# Patient Record
Sex: FEMALE | Race: WHITE | NOT HISPANIC OR LATINO | Employment: FULL TIME | URBAN - METROPOLITAN AREA
[De-identification: names, ages, dates, MRNs, and addresses within clinical notes are randomized per-mention and may not be internally consistent; named-entity substitution may affect disease eponyms.]

---

## 2017-03-16 ENCOUNTER — DOCUMENTATION (OUTPATIENT)
Dept: BARIATRICS/WEIGHT MGMT | Facility: CLINIC | Age: 37
End: 2017-03-16

## 2017-03-16 DIAGNOSIS — R53.83 FATIGUE, UNSPECIFIED TYPE: ICD-10-CM

## 2017-03-16 DIAGNOSIS — R06.00 DYSPNEA, UNSPECIFIED TYPE: Primary | ICD-10-CM

## 2017-03-16 DIAGNOSIS — R10.13 DYSPEPSIA: ICD-10-CM

## 2017-03-27 DIAGNOSIS — R53.83 FATIGUE, UNSPECIFIED TYPE: ICD-10-CM

## 2017-03-27 DIAGNOSIS — R10.13 DYSPEPSIA: ICD-10-CM

## 2017-03-27 DIAGNOSIS — R06.00 DYSPNEA, UNSPECIFIED TYPE: ICD-10-CM

## 2017-03-29 ENCOUNTER — DOCUMENTATION (OUTPATIENT)
Dept: BARIATRICS/WEIGHT MGMT | Facility: HOSPITAL | Age: 37
End: 2017-03-29

## 2017-03-29 ENCOUNTER — OFFICE VISIT (OUTPATIENT)
Dept: BARIATRICS/WEIGHT MGMT | Facility: CLINIC | Age: 37
End: 2017-03-29

## 2017-03-29 VITALS
OXYGEN SATURATION: 99 % | RESPIRATION RATE: 18 BRPM | HEIGHT: 69 IN | TEMPERATURE: 98.1 F | HEART RATE: 88 BPM | WEIGHT: 293 LBS | SYSTOLIC BLOOD PRESSURE: 124 MMHG | BODY MASS INDEX: 43.4 KG/M2 | DIASTOLIC BLOOD PRESSURE: 84 MMHG

## 2017-03-29 DIAGNOSIS — R53.83 OTHER FATIGUE: ICD-10-CM

## 2017-03-29 DIAGNOSIS — M54.9 BACK PAIN, UNSPECIFIED BACK LOCATION, UNSPECIFIED BACK PAIN LATERALITY, UNSPECIFIED CHRONICITY: ICD-10-CM

## 2017-03-29 DIAGNOSIS — M17.0 OSTEOARTHRITIS OF BOTH KNEES, UNSPECIFIED OSTEOARTHRITIS TYPE: ICD-10-CM

## 2017-03-29 DIAGNOSIS — G47.33 OBSTRUCTIVE SLEEP APNEA SYNDROME: Primary | ICD-10-CM

## 2017-03-29 PROCEDURE — 99406 BEHAV CHNG SMOKING 3-10 MIN: CPT | Performed by: SURGERY

## 2017-03-29 PROCEDURE — 99205 OFFICE O/P NEW HI 60 MIN: CPT | Performed by: SURGERY

## 2017-03-29 NOTE — PROGRESS NOTES
[facility] GROUP BARIATRIC SURGERY  [address]  [facility] [address]  [phone]      Patient  Name:  Alanis Bello.  :  1980      Date of Visit: 2017      Chief Complaint:  weight gain; unable to maintain weight loss    History of Present Illness:  Alanis Bello is a 36 y.o. female who presents today for evaluation, education and consultation regarding weight loss surgery. The patient is interested in sleeve gastrectomy     Alanis has been overweight for at least 26 years, has been 35 pounds or more overweight for at least 20 years, has been 100 pounds or more overweight for 18 or more years and started dieting at age 12.  Alanis describes her eating habits as emotional eater, snacker.     Previous diet attempts include: High Protein, Low Carbohydrate, Calorie Counting, Indra's Diet, Cabbage Soup and Slim Fast; Diet Center; Acutrim, Dexatrim and Ionamin/Adipex.  The most weight Alanis lost was 180 pounds on diet pills but was only able to maintain that weight loss for 2 yrs.  Her maximum lifetime weight is 363 pounds.    She watched the videos online.  Her mother had a sleeve in [place] and is doing very well.      Past Medical History:   Diagnosis Date   • Back pain    • DDD (degenerative disc disease), lumbar    • Fatigue    •  (normal spontaneous vaginal delivery)     x 1, no bleeding problems   • Osteoarthritis of both knees    • Sleep apnea     She uses CPAP most nights, but is awaiting new mask.     Past Surgical History:   Procedure Laterality Date   •  SECTION  2007    x 1   • LAPAROSCOPIC TUBAL LIGATION     • FADUMO      has not had a pap smear since then       No Known Allergies    No current outpatient prescriptions on file.    Social History     Social History   • Marital status:      Spouse name: N/A   • Number of children: N/A   • Years of education: N/A     Occupational History   •  [employer]     Social History Main Topics   • Smoking status: Current Some Day Smoker     Types: Cigarettes   • Smokeless tobacco: Never Used      Comment: Has 1 cigarette every few days.  Is around secondhand smoke with , who usually smokes outside.     • Alcohol use No   • Drug use: No   • Sexual activity: Defer     Other Topics Concern   • Not on file     Social History Narrative    Lives with  and two children.     Family History   Problem Relation Age of Onset   • Obesity Mother    • Diabetes Mother    • Hypertension Mother    • Sleep apnea Mother    • Hypertension Father    • Heart disease Father    • Heart attack Father 30   • Sleep apnea Father    • Obesity Maternal Grandmother    • Diabetes Maternal Grandmother    • Hypertension Maternal Grandmother    • Sleep apnea Maternal Grandmother    • Hypertension Maternal Grandfather    • Sleep apnea Maternal Grandfather    • Sleep apnea Paternal Grandmother    • Sleep apnea Paternal Grandfather    • Heart disease Paternal Grandfather    • Heart attack Paternal Grandfather    • Stroke Paternal Grandfather    • Hypertension Paternal Grandfather          Review of Systems:  Constitutional:  The patient reports fatigue, weight gain and denies fevers and chills.  Cardiovascular:  The patient reports edema and denies HTN, HLD, CP, MI, heart disease and DVT.  Respiratory:  The patient reports apnea and denies asthma and PE.  Gastrointestinal:  The patient reports none and denies heartburn, pancreatitis, liver disease and IBS.  Genitourinary:  The patient denies renal insufficiency.    Musculoskeletal:  The patient reports joint pain, arthritis and denies fibromyalgia and autoimmune disease.  Neurological:  The patient reports none and denies seizure and stroke.  Psychiatric:  The patient reports none and denies anxiety, depression and bipolar disorder.  Endocrine:  The patient reports none and denies diabetes, thyroid disease and gout.  Hematologic:  The patient  reports none and denies anemia and bleeding disorder.  Skin:  The patient denies MRSA.    Physical Exam:  Vital Signs:  Weight: (!) 363 lb 8 oz (165 kg)   Body mass index is 54.47 kg/(m^2).  Temp: 98.1 °F (36.7 °C)   Heart Rate: 88   BP: 124/84     Physical Exam   Constitutional: She is oriented to person, place, and time. She appears well-developed and well-nourished.   HENT:   Head: Normocephalic and atraumatic.   Right Ear: External ear normal.   Left Ear: External ear normal.   Mouth/Throat: No oropharyngeal exudate.   Eyes: Conjunctivae and EOM are normal. Pupils are equal, round, and reactive to light. No scleral icterus.   Neck: Neck supple. No tracheal deviation present. No thyromegaly present.   Cardiovascular: Normal rate, regular rhythm and normal heart sounds.    No murmur heard.  Pulmonary/Chest: Effort normal and breath sounds normal. No respiratory distress.   Abdominal: Soft. She exhibits no distension and no mass. There is no tenderness. No hernia.       Musculoskeletal: Normal range of motion. She exhibits no tenderness or deformity.   Neurological: She is alert and oriented to person, place, and time. No cranial nerve deficit.   Skin: Skin is warm and dry. No rash noted. She is not diaphoretic. No erythema.   Psychiatric: She has a normal mood and affect. Her behavior is normal. Judgment and thought content normal.        There is no problem list on file for this patient.      Assessment:    Alanis Bello is a 36 y.o. year old female with medically complicated obesity pursuing sleeve gastrectomy.    Weight loss surgery is deemed medically necessary given the following obesity related comorbidities including sleep apnea, back pain, knee pain, edema, menstrual irregularities and tobacco use with current Weight: (!) 363 lb 8 oz (165 kg) and Body mass index is 54.47 kg/(m^2)..    Plan:  The consultation plan and program requirements were reviewed with the patient.  The patient has been advised that a  letter of medical support must be obtained from her primary care physician or referring provider. A psychological evaluation will be arranged.  A nutritional evaluation will be performed.  The patient was advised to start a high protein and low carbohydrate diet.  Necessary lifestyle modifications were discussed.  Instructions on how to access VENKAT was given to the patient.  VENKAT is an internet based educational video that explains the surgical procedure chosen and answers basic questions regarding that procedure.     Preoperative testing will include: CBC, CMP, Fasting Lipids, TSH, H.Pylori, Urine Anabasine, PFTs, CXR and EKG     Additional preop clearances required prior to surgery: cardiac    Patient understands that bariatric surgery is not cosmetic surgery but rather a tool to help make a lifelong commitment to lifestyle changes including diet, exercise, behavior modifications, and healthy habits.    She is aware of my strong recommendation to quit smoking before surgery.      Of note, she has had a LEEP before and has heavy periods.  I have a strong recommendation that she see a gynecologist before surgery.    I spent 60 minutes with the patient and over half the time was spent counseling.      I spent 3-10 minutes discussion smoking cessation and/or avoidance of second-hand smoke.      Anette Auguste MD

## 2017-03-31 LAB
ALBUMIN SERPL-MCNC: 4.4 G/DL (ref 3.2–4.8)
ALBUMIN/GLOB SERPL: 1.4 G/DL (ref 1.5–2.5)
ALP SERPL-CCNC: 88 U/L (ref 25–100)
ALT SERPL-CCNC: 46 U/L (ref 7–40)
AST SERPL-CCNC: 47 U/L (ref 0–33)
BILIRUB SERPL-MCNC: 0.4 MG/DL (ref 0.3–1.2)
BUN SERPL-MCNC: 7 MG/DL (ref 9–23)
BUN/CREAT SERPL: 11.7 (ref 7–25)
CALCIUM SERPL-MCNC: 9.7 MG/DL (ref 8.7–10.4)
CHLORIDE SERPL-SCNC: 107 MMOL/L (ref 99–109)
CHOLEST SERPL-MCNC: 207 MG/DL (ref 0–200)
CO2 SERPL-SCNC: 25 MMOL/L (ref 20–31)
CREAT SERPL-MCNC: 0.6 MG/DL (ref 0.6–1.3)
ERYTHROCYTE [DISTWIDTH] IN BLOOD BY AUTOMATED COUNT: 13.7 % (ref 11.3–14.5)
GLOBULIN SER CALC-MCNC: 3.1 GM/DL
GLUCOSE SERPL-MCNC: 99 MG/DL (ref 70–100)
H PYLORI IGA SER-ACNC: <9 UNITS (ref 0–8.9)
H PYLORI IGG SER IA-ACNC: <0.9 U/ML (ref 0–0.8)
H PYLORI IGM SER-ACNC: <9 UNITS (ref 0–8.9)
HCT VFR BLD AUTO: 47.8 % (ref 34.5–44)
HDLC SERPL-MCNC: 43 MG/DL (ref 40–60)
HGB BLD-MCNC: 15.8 G/DL (ref 11.5–15.5)
LDLC SERPL CALC-MCNC: 140 MG/DL (ref 0–100)
MCH RBC QN AUTO: 30.6 PG (ref 27–31)
MCHC RBC AUTO-ENTMCNC: 33.1 G/DL (ref 32–36)
MCV RBC AUTO: 92.5 FL (ref 80–99)
PLATELET # BLD AUTO: 275 10*3/MM3 (ref 150–450)
POTASSIUM SERPL-SCNC: 4.3 MMOL/L (ref 3.5–5.5)
PROT SERPL-MCNC: 7.5 G/DL (ref 5.7–8.2)
RBC # BLD AUTO: 5.17 10*6/MM3 (ref 3.89–5.14)
SODIUM SERPL-SCNC: 142 MMOL/L (ref 132–146)
TRIGL SERPL-MCNC: 122 MG/DL (ref 0–150)
TSH SERPL DL<=0.005 MIU/L-ACNC: 2.1 MIU/ML (ref 0.35–5.35)
VLDLC SERPL CALC-MCNC: 24.4 MG/DL
WBC # BLD AUTO: 9.15 10*3/MM3 (ref 3.5–10.8)

## 2017-04-04 NOTE — PROGRESS NOTES
"Weight Loss Surgery  Presurgical Nutrition Assessment     Alanis Bello  2017  [id number]  [id number]  1980  female    Surgery desired: Sleeve Gastrectomy    Vital Signs:  Weight: (!) 363 lb 8 oz (165 kg)   Body mass index is 54.47 kg/(m^2).  Past Medical History:   Diagnosis Date   • Back pain    • DDD (degenerative disc disease), lumbar    • Fatigue    •  (normal spontaneous vaginal delivery)     x 1, no bleeding problems   • Osteoarthritis of both knees    • Sleep apnea     She uses CPAP most nights, but is awaiting new mask.     Past Surgical History:   Procedure Laterality Date   •  SECTION  2007    x 1   • LAPAROSCOPIC TUBAL LIGATION     • LEEP      has not had a pap smear since then     No Known Allergies  No current outpatient prescriptions on file.      Nutrition Assessment    Estimated energy needs:     Estimated calories for weight loss:    IBW (Pounds):        Excess body weight (Pounds):       Nutrition Recall  24 Hour recall: (B) (L) (D) -  Reviewed and discussed with patient       in general, an \"unhealthy\" diet      Exercise  Plays softball 2-3 times per week.       Education    Provided manual:    Sleeve Gastrectomy    Recommend that team proceed with surgery and follow per protocol.      Nutrition Goals   Dietary Guidelines per manual  Protein goal:  grams per day   Eliminate soda    Exercise Goals  Continue current exercise routine   Add 15-30 minutes of activity per day as tolerated        Moon Landis RD  2017  11:29 AM  "

## 2017-08-17 ENCOUNTER — CONSULT (OUTPATIENT)
Dept: CARDIOLOGY | Facility: CLINIC | Age: 37
End: 2017-08-17

## 2017-08-17 VITALS
HEIGHT: 71 IN | SYSTOLIC BLOOD PRESSURE: 138 MMHG | BODY MASS INDEX: 41.02 KG/M2 | WEIGHT: 293 LBS | DIASTOLIC BLOOD PRESSURE: 90 MMHG | HEART RATE: 79 BPM

## 2017-08-17 DIAGNOSIS — R06.02 SOB (SHORTNESS OF BREATH): ICD-10-CM

## 2017-08-17 DIAGNOSIS — R03.0 ELEVATED BP WITHOUT DIAGNOSIS OF HYPERTENSION: ICD-10-CM

## 2017-08-17 DIAGNOSIS — Z01.810 PREOP CARDIOVASCULAR EXAM: Primary | ICD-10-CM

## 2017-08-17 PROCEDURE — 99244 OFF/OP CNSLTJ NEW/EST MOD 40: CPT | Performed by: INTERNAL MEDICINE

## 2017-08-17 PROCEDURE — 93000 ELECTROCARDIOGRAM COMPLETE: CPT | Performed by: INTERNAL MEDICINE

## 2017-08-17 NOTE — PROGRESS NOTES
[facility]  Consultation H&P  Alanis Bello  1980  [phone]  [phone]  VISIT DATE:  2017    PCP: Efrain Boss MD  [address] 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 Morbid obesity  2. HLD  1. 3/29/17 lipids: , , HDL 43,   3. Sleep apnea  4. Degenerative disc disease  5. Fatigue  6. Osteoarthritis  7. Tobacco abuse  8.   9. Surgical Hx  1.   2. Tubal ligation  3. LEEP    Allergies  No Known Allergies    Current Medications  No current outpatient prescriptions on file.     History of Present Illness   HPI  This is a 36-year-old  female with the above mentioned PMH who presents for consult from Dr. Auguste for evaluation for cardiac clearance for sleeve gastrectomy.    Is exercising, pays softball 2-3 times per week. Working on smoking cessation. Does get BLE edema, she drives a bus for a living. Edema always improves in the morning. Has been checking BP occasionally at home and gets high normal readings. Does have joint pain and takes NSAIDs daily for pain.     Pt denies any chest pain, dyspnea at rest, dyspnea on exertion, orthopnea, palpitations, lower extremity edema, or claudication. Pt denies history of CHF, DVT, PE, MI, CVA, TIA, or rheumatic fever. Grandparents with MIs and CVAs at 70+, father with MI early 30s.      ROS  Review of Systems   Constitution: Negative for malaise/fatigue.   HENT: Negative for headaches.    Eyes: Negative for blurred vision.   Cardiovascular: Negative for chest pain, orthopnea, palpitations and paroxysmal nocturnal dyspnea.   Respiratory: Positive for sleep disturbances due to breathing. Negative for shortness of breath.    Musculoskeletal: Positive for arthritis, back pain and joint pain. Negative for neck pain.   All other  "systems reviewed and are negative.      SOCIAL HX  Social History     Social History   • Marital status:      Spouse name: N/A   • Number of children: N/A   • Years of education: N/A     Occupational History   •  [employer]     Social History Main Topics   • Smoking status: Current Some Day Smoker     Types: Cigarettes   • Smokeless tobacco: Never Used   • Alcohol use No   • Drug use: No   • Sexual activity: Defer     Other Topics Concern   • Not on file     Social History Narrative    Lives with  and two children.       FAMILY HX  Family History   Problem Relation Age of Onset   • Obesity Mother    • Diabetes Mother    • Hypertension Mother    • Sleep apnea Mother    • Hypertension Father    • Heart disease Father    • Heart attack Father 30   • Sleep apnea Father    • Obesity Maternal Grandmother    • Diabetes Maternal Grandmother    • Hypertension Maternal Grandmother    • Sleep apnea Maternal Grandmother    • Hypertension Maternal Grandfather    • Sleep apnea Maternal Grandfather    • Sleep apnea Paternal Grandmother    • Sleep apnea Paternal Grandfather    • Heart disease Paternal Grandfather    • Heart attack Paternal Grandfather    • Stroke Paternal Grandfather    • Hypertension Paternal Grandfather        Vitals:    08/17/17 1009   BP: 138/90   BP Location: Right arm   Patient Position: Sitting   Pulse: 79   Weight: (!) 365 lb (166 kg)   Height: 71\" (180.3 cm)       PHYSICAL EXAMINATION:  Physical Exam   Constitutional: She is oriented to person, place, and time. She appears well-developed and well-nourished. No distress.   Obese     HENT:   Head: Normocephalic and atraumatic.   Right Ear: External ear normal.   Left Ear: External ear normal.   Nose: Nose normal.   Eyes: Conjunctivae and EOM are normal.   Neck: Neck supple. No hepatojugular reflux and no JVD present. Carotid bruit is not present. No thyromegaly present.   Cardiovascular: Normal rate, regular rhythm, " S1 normal, S2 normal, normal heart sounds, intact distal pulses and normal pulses.  Exam reveals no gallop, no distant heart sounds and no midsystolic click.    No murmur heard.  Pulses:       Radial pulses are 2+ on the right side, and 2+ on the left side.        Dorsalis pedis pulses are 2+ on the right side, and 2+ on the left side.        Posterior tibial pulses are 2+ on the right side, and 2+ on the left side.   Pulmonary/Chest: Effort normal and breath sounds normal. No respiratory distress. She has no decreased breath sounds. She has no wheezes. She has no rhonchi. She has no rales.   Abdominal: Soft. Bowel sounds are normal. There is no hepatosplenomegaly. There is no tenderness.   Musculoskeletal: Normal range of motion. She exhibits no edema.   Neurological: She is alert and oriented to person, place, and time.   No focal deficits.   Skin: Skin is warm and dry. No erythema.   Psychiatric: She has a normal mood and affect. Thought content normal.   Nursing note and vitals reviewed.      Diagnostic Data:    ECG 12 Lead  Date/Time: 8/17/2017 10:53 AM  Performed by: JUSTINE YU  Authorized by: JUSTINE YU   Rhythm: sinus rhythm  BPM: 79  Clinical impression: non-specific ECG  Comments: nonspecific T wave abnormality           Lab Results   Component Value Date    CHLPL 207 (H) 03/29/2017    TRIG 122 03/29/2017    HDL 43 03/29/2017     Lab Results   Component Value Date    BUN 7 (L) 03/29/2017    CREATININE 0.60 03/29/2017     03/29/2017    K 4.3 03/29/2017     03/29/2017    CO2 25.0 03/29/2017     No results found for: HGBA1C  Lab Results   Component Value Date    WBC 9.15 03/29/2017    HGB 15.8 (H) 03/29/2017    HCT 47.8 (H) 03/29/2017     03/29/2017       ASSESSMENT:   Diagnosis Plan   1. Preop cardiovascular exam     2. Elevated BP without diagnosis of hypertension     3. SOB (shortness of breath)         PLAN:  1. Due to history of using weight loss supplements will evaluate  structure and function of heart with echocardiogram. Cardiac risk for gastric sleeve with Dr. Auguste pending echo results.   2. Likely whitecoat hypertension component, patient instructed to continue checking BP at home and to monitor her blood pressure after surgery as well.  If pressures stay elevated at home after weight loss will consider addition of antihypertensive at that time.    Scribed for Alejandro Espinoza MD by Tabitha Mendez PA-C. 8/17/2017  10:53 AM  I, Alejandro Espinoza MD, personally performed the services described in this documentation as scribed by the above named individual in my presence, and it is both accurate and complete.  8/17/2017  11:13 AM    Alejandro Espinoza MD, FACC

## 2017-08-25 ENCOUNTER — HOSPITAL ENCOUNTER (OUTPATIENT)
Dept: CARDIOLOGY | Facility: HOSPITAL | Age: 37
Discharge: HOME | End: 2017-08-25
Attending: INTERNAL MEDICINE | Admitting: INTERNAL MEDICINE

## 2017-08-25 VITALS
SYSTOLIC BLOOD PRESSURE: 137 MMHG | BODY MASS INDEX: 41.02 KG/M2 | HEIGHT: 71 IN | DIASTOLIC BLOOD PRESSURE: 77 MMHG | WEIGHT: 293 LBS

## 2017-08-25 LAB
BH CV ECHO MEAS - AO ROOT AREA (BSA CORRECTED): 1.2
BH CV ECHO MEAS - AO ROOT AREA: 7.8 CM^2
BH CV ECHO MEAS - AO ROOT DIAM: 3.1 CM
BH CV ECHO MEAS - BSA(HAYCOCK): 3 M^2
BH CV ECHO MEAS - BSA: 2.7 M^2
BH CV ECHO MEAS - BZI_BMI: 50.9 KILOGRAMS/M^2
BH CV ECHO MEAS - BZI_METRIC_HEIGHT: 180.3 CM
BH CV ECHO MEAS - BZI_METRIC_WEIGHT: 165.6 KG
BH CV ECHO MEAS - CONTRAST EF (2CH): 53 ML/M^2
BH CV ECHO MEAS - CONTRAST EF 4CH: 54.5 ML/M^2
BH CV ECHO MEAS - EDV(CUBED): 100.9 ML
BH CV ECHO MEAS - EDV(MOD-SP2): 181 ML
BH CV ECHO MEAS - EDV(MOD-SP4): 200 ML
BH CV ECHO MEAS - EDV(TEICH): 100.1 ML
BH CV ECHO MEAS - EF(CUBED): 88.7 %
BH CV ECHO MEAS - EF(MOD-SP2): 53 %
BH CV ECHO MEAS - EF(MOD-SP4): 55 %
BH CV ECHO MEAS - EF(TEICH): 82.9 %
BH CV ECHO MEAS - ESV(CUBED): 11.4 ML
BH CV ECHO MEAS - ESV(MOD-SP2): 85 ML
BH CV ECHO MEAS - ESV(MOD-SP4): 91 ML
BH CV ECHO MEAS - ESV(TEICH): 17.1 ML
BH CV ECHO MEAS - FS: 51.7 %
BH CV ECHO MEAS - IVS/LVPW: 0.93
BH CV ECHO MEAS - IVSD: 1.1 CM
BH CV ECHO MEAS - LA DIMENSION: 4.1 CM
BH CV ECHO MEAS - LA/AO: 1.3
BH CV ECHO MEAS - LAT PEAK E' VEL: 10.4 CM/SEC
BH CV ECHO MEAS - LV DIASTOLIC VOL/BSA (35-75): 73.4 ML/M^2
BH CV ECHO MEAS - LV IVRT: 0.09 SEC
BH CV ECHO MEAS - LV MASS(C)D: 205 GRAMS
BH CV ECHO MEAS - LV MASS(C)DI: 75.3 GRAMS/M^2
BH CV ECHO MEAS - LV SYSTOLIC VOL/BSA (12-30): 33.4 ML/M^2
BH CV ECHO MEAS - LVIDD: 4.7 CM
BH CV ECHO MEAS - LVIDS: 2.2 CM
BH CV ECHO MEAS - LVLD AP2: 8.8 CM
BH CV ECHO MEAS - LVLD AP4: 9.2 CM
BH CV ECHO MEAS - LVLS AP2: 7.5 CM
BH CV ECHO MEAS - LVLS AP4: 7.6 CM
BH CV ECHO MEAS - LVOT AREA (M): 3.1 CM^2
BH CV ECHO MEAS - LVOT AREA: 3.2 CM^2
BH CV ECHO MEAS - LVOT DIAM: 2 CM
BH CV ECHO MEAS - LVPWD: 1.2 CM
BH CV ECHO MEAS - MED PEAK E' VEL: 11.2 CM/SEC
BH CV ECHO MEAS - MPA AREA: 8.6 CM^2
BH CV ECHO MEAS - MPA DIAM: 3.3 CM
BH CV ECHO MEAS - MV A MAX VEL: 68.1 CM/SEC
BH CV ECHO MEAS - MV DEC TIME: 0.19 SEC
BH CV ECHO MEAS - MV E MAX VEL: 99.7 CM/SEC
BH CV ECHO MEAS - MV E/A: 1.5
BH CV ECHO MEAS - PA ACC SLOPE: 522.7 CM/SEC^2
BH CV ECHO MEAS - PA ACC TIME: 0.16 SEC
BH CV ECHO MEAS - PA PR(ACCEL): 6.9 MMHG
BH CV ECHO MEAS - PI END-D VEL: 98.2 CM/SEC
BH CV ECHO MEAS - PULM A REVS VEL: 19.1 CM/SEC
BH CV ECHO MEAS - PULM DIAS VEL: 53.7 CM/SEC
BH CV ECHO MEAS - PULM S/D: 1.3
BH CV ECHO MEAS - PULM SYS VEL: 68.4 CM/SEC
BH CV ECHO MEAS - RAP SYSTOLE: 8 MMHG
BH CV ECHO MEAS - RVSP: 29 MMHG
BH CV ECHO MEAS - SI(CUBED): 32.9 ML/M^2
BH CV ECHO MEAS - SI(MOD-SP2): 35.3 ML/M^2
BH CV ECHO MEAS - SI(MOD-SP4): 40 ML/M^2
BH CV ECHO MEAS - SI(TEICH): 30.5 ML/M^2
BH CV ECHO MEAS - SV(CUBED): 89.5 ML
BH CV ECHO MEAS - SV(MOD-SP2): 96 ML
BH CV ECHO MEAS - SV(MOD-SP4): 109 ML
BH CV ECHO MEAS - SV(TEICH): 82.9 ML
BH CV ECHO MEAS - TAPSE (>1.6): 2.8 CM2
BH CV ECHO MEAS - TR MAX VEL: 229 CM/SEC
BH CV VAS BP LEFT ARM: NORMAL MMHG
BH CV XLRA - RV BASE: 5.4 CM
BH CV XLRA - RV LENGTH: 8.6 CM
BH CV XLRA - RV MID: 4.3 CM
BH CV XLRA - TDI S': 10.6 CM/SEC
E/E' RATIO: 9.2
LEFT ATRIUM VOLUME INDEX: 31.2 ML/M2
LV EF 2D ECHO EST: 55 %

## 2017-08-25 PROCEDURE — 93306 TTE W/DOPPLER COMPLETE: CPT

## 2017-08-25 PROCEDURE — 93306 TTE W/DOPPLER COMPLETE: CPT | Performed by: INTERNAL MEDICINE
